# Patient Record
Sex: MALE | Race: BLACK OR AFRICAN AMERICAN | Employment: UNEMPLOYED | URBAN - METROPOLITAN AREA
[De-identification: names, ages, dates, MRNs, and addresses within clinical notes are randomized per-mention and may not be internally consistent; named-entity substitution may affect disease eponyms.]

---
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
## 2022-01-04 DIAGNOSIS — E25.9 CAH 21-OH (CONGENITAL ADRENAL HYPERPLASIA), LATE ONSET (HCC): ICD-10-CM

## 2022-01-04 RX ORDER — PREDNISONE 1 MG/1
TABLET ORAL
Qty: 720 TABLET | Refills: 4 | Status: SHIPPED | OUTPATIENT
Start: 2022-01-04

## 2022-01-04 RX ORDER — FLUDROCORTISONE ACETATE 0.1 MG/1
TABLET ORAL
Qty: 90 TABLET | Refills: 3 | Status: SHIPPED | OUTPATIENT
Start: 2022-01-04